# Patient Record
(demographics unavailable — no encounter records)

---

## 2024-10-09 NOTE — HISTORY OF PRESENT ILLNESS
[de-identified] : 15-year-old girl was seen in the office today along with her father.  3 months ago she had the spontaneous onset of midline lower back pain.  She has not had leg pain.  She has not had lower extremity neurologic symptoms of numbness, paresthesias or weakness.  She has not had night pain.  The pain can be worse coughing and sneezing but no worse forcing to move her bowels.  The pain is no worse sitting, standing or walking but it is worse bending.  Treatment to date has been only Tylenol.  Her past medical history and review of systems are negative. [Pain Location] : pain [7] : a maximum pain level of 7/10 [Intermit.] : ~He/She~ states the symptoms seem to be intermittent

## 2024-10-09 NOTE — DISCUSSION/SUMMARY
[Medication Risks Reviewed] : Medication risks reviewed [de-identified] : She will rest and use moist heat.  She has been started on Naprosyn 500 mg twice a day as a nonsteroidal anti-inflammatory.  They will call if there are problems with the medication or worsening of her symptoms and I will see her for follow-up in 4 weeks.

## 2024-10-09 NOTE — PHYSICAL EXAM
[de-identified] : She is fully alert and oriented with a normal mood and affect.  She is in no acute distress as I take the history.  She ambulates with a normal gait including tiptoe and heel walking.  There are no antalgic or coxalgia components to her gait.  There are no cutaneous abnormalities or palpable bony defects of the spine.  There is no evidence of shortness of breath or respiratory distress.  There is no paravertebral muscle spasm, sciatic notch tenderness or trochanteric tenderness.  Surprisingly forward flexion of the spine to 70 degrees and extension to 15 degrees is painless.  Her lower extremity neurological examination revealed 1-2+ symmetrical reflexes.  Motor power is normal to manual testing in all lower extremity groups and sensation is normal to light touch in all dermatomes.  Straight leg raising is negative to 90 degrees in the sitting position bilaterally.  Her hips and her knees have a full and painless range of motion with normal stability.  Vascular examination shows no evidence of varicosities and there is no lymphedema.  There are no cutaneous abnormalities of the upper or the lower extremities. [de-identified] : AP and lateral x-rays of the lumbar spine are obtained.  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  I did not get oblique x-rays.  There are no destructive changes.

## 2024-11-05 NOTE — DISCUSSION/SUMMARY
[Medication Risks Reviewed] : Medication risks reviewed [de-identified] : She will resume the Naprosyn 500 mg twice a day for 10 days and if she has had full resolution of the symptoms taper to 1 Aleve twice a day for another 10 days before discontinuing anti-inflammatory medication altogether.  I will see her for follow-up on a as needed basis.

## 2024-11-05 NOTE — PHYSICAL EXAM
[de-identified] : She is comfortable sitting today.  Straight leg raising is negative to 90 degrees again.

## 2024-11-05 NOTE — HISTORY OF PRESENT ILLNESS
[de-identified] : I saw this 15-year-old 4 weeks ago with some chronic midline lower back pain.  She did not have leg pain or neurologic symptoms.  X-rays revealed a mild grade 1 spondylolytic spondylolisthesis.  She was started on Naprosyn 500 mg twice a day.  She returns today and states that the intermittent back pain that was bad as a 6 or 7 is intermittent and only an occasional ache.  She had her medication with her and she took only 15 tablets in the last month.